# Patient Record
Sex: FEMALE | Race: WHITE | NOT HISPANIC OR LATINO | URBAN - METROPOLITAN AREA
[De-identification: names, ages, dates, MRNs, and addresses within clinical notes are randomized per-mention and may not be internally consistent; named-entity substitution may affect disease eponyms.]

---

## 2017-03-10 PROBLEM — Z00.00 ENCOUNTER FOR PREVENTIVE HEALTH EXAMINATION: Status: ACTIVE | Noted: 2017-03-10

## 2017-11-03 ENCOUNTER — EMERGENCY (EMERGENCY)
Facility: HOSPITAL | Age: 22
LOS: 1 days | End: 2017-11-03
Attending: EMERGENCY MEDICINE
Payer: COMMERCIAL

## 2017-11-03 VITALS
SYSTOLIC BLOOD PRESSURE: 134 MMHG | RESPIRATION RATE: 18 BRPM | DIASTOLIC BLOOD PRESSURE: 83 MMHG | OXYGEN SATURATION: 98 % | HEART RATE: 88 BPM

## 2017-11-03 VITALS
RESPIRATION RATE: 18 BRPM | WEIGHT: 250 LBS | HEIGHT: 68 IN | HEART RATE: 93 BPM | TEMPERATURE: 98 F | OXYGEN SATURATION: 99 % | DIASTOLIC BLOOD PRESSURE: 89 MMHG | SYSTOLIC BLOOD PRESSURE: 135 MMHG

## 2017-11-03 PROCEDURE — 72128 CT CHEST SPINE W/O DYE: CPT | Mod: 26

## 2017-11-03 PROCEDURE — 99285 EMERGENCY DEPT VISIT HI MDM: CPT

## 2017-11-03 PROCEDURE — 72128 CT CHEST SPINE W/O DYE: CPT

## 2017-11-03 PROCEDURE — 72125 CT NECK SPINE W/O DYE: CPT

## 2017-11-03 PROCEDURE — 72125 CT NECK SPINE W/O DYE: CPT | Mod: 26

## 2017-11-03 PROCEDURE — 73030 X-RAY EXAM OF SHOULDER: CPT

## 2017-11-03 PROCEDURE — 73030 X-RAY EXAM OF SHOULDER: CPT | Mod: 26,50

## 2017-11-03 PROCEDURE — 99284 EMERGENCY DEPT VISIT MOD MDM: CPT | Mod: 25

## 2017-11-03 RX ORDER — DIAZEPAM 5 MG
10 TABLET ORAL ONCE
Qty: 0 | Refills: 0 | Status: DISCONTINUED | OUTPATIENT
Start: 2017-11-03 | End: 2017-11-03

## 2017-11-03 RX ORDER — DIAZEPAM 5 MG
1 TABLET ORAL
Qty: 6 | Refills: 0 | OUTPATIENT
Start: 2017-11-03 | End: 2017-11-06

## 2017-11-03 RX ORDER — IBUPROFEN 200 MG
1 TABLET ORAL
Qty: 12 | Refills: 0 | OUTPATIENT
Start: 2017-11-03 | End: 2017-11-06

## 2017-11-03 RX ORDER — IBUPROFEN 200 MG
600 TABLET ORAL ONCE
Qty: 0 | Refills: 0 | Status: COMPLETED | OUTPATIENT
Start: 2017-11-03 | End: 2017-11-03

## 2017-11-03 RX ORDER — OXYCODONE AND ACETAMINOPHEN 5; 325 MG/1; MG/1
1 TABLET ORAL ONCE
Qty: 0 | Refills: 0 | Status: DISCONTINUED | OUTPATIENT
Start: 2017-11-03 | End: 2017-11-03

## 2017-11-03 RX ADMIN — Medication 600 MILLIGRAM(S): at 21:00

## 2017-11-03 RX ADMIN — OXYCODONE AND ACETAMINOPHEN 1 TABLET(S): 5; 325 TABLET ORAL at 22:54

## 2017-11-03 RX ADMIN — Medication 10 MILLIGRAM(S): at 20:16

## 2017-11-03 RX ADMIN — Medication 600 MILLIGRAM(S): at 20:16
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
## 2018-06-21 NOTE — ED ADULT NURSE NOTE - MUSCULOSKELETAL ASSESSMENT
Unable to contact mom to inform her pt's appt needs to be r/s'd due to MD being out. Pt's appt will be cancelled. A letter was mailed out today informing mom of cancellation and requesting she call to r/s.  
- - -

## 2019-07-22 NOTE — ED ADULT NURSE NOTE - CAS EDP DISCH TYPE
Physical Therapy Evaluation    Visit Count: 1     Plan of Care: 7/22/2019 Through: 9/2/2019  Insurance Information:  Payor: Medicaid - [facility] ([facility])  Authorization Needed: No  Maximum Visit Limit Per Year: Based on medical necessity at point of claim  Referred by: Brianne Duran DO; Next provider visit (if known/scheduled): 08/08/2019  Medical Diagnosis (from order):   Diagnosis Information      Diagnosis    646.80, 724.5 (ICD-9-CM) - O99.89, M54.9 (ICD-10-CM) - Back pain in pregnancy            Treatment Diagnosis: Lumbar/Sacroiliac symptoms with increased pain/symptoms, impaired posture, impaired strength, impaired range of motion, impaired gait  Date of onset/injury: Mid pregnancy  Diagnosis Precautions: Pregnant  Chart reviewed at time of initial evaluation (relevant co-morbidities, allergies, tests and medications listed): Reviewed with patient: 22 weeks     SUBJECTIVE   Description of Problem and/or Mechanism of Injury: Reports she has had problems with her sciatic nerve before, but since being pregnant things are worse. Reports symptoms started mid pregnancy and has progressively gotten worse. At this time having trouble sitting/standing for longer than an hour. At this time is sleeping on her side but is having trouble getting comfortable due to the pain.     Occupation: Desk work,     Pain:  Intensity (0-10 scale): Current: 7; Pain Range (best-worst): 2-10  Location: Across low back, right sciatic on occasion, mid thoracic pain   Quality/Description: Ache, Sharp, Stiff  Relieving/Alleviating factors: rest  Any tripping, stumbling, loss of balance: No  Any changes in bowel/bladder function: No  Pain with coughing and sneezing: No  Any numbness/tingling or radiating pain: No  Night pain: No    Function:  Limitations/exacerbating factors: pain, difficulty, increased time to complete with bed mobility, sitting limited to 60 minutes, standing limited to 60 minutes   Prior  level: independent with all activities of daily living and instrumental activities of daily living (history of sciatic)    Patient Goal:   Improve sleep  Standing/Sitting for longer than 60 minutes     Prior Treatment: no therapies in the past year for current condition. Hospitalization, home health services or skilled nursing facility in the last 30 days: No, per patient.    Home Environment/Social Support: Patient lives with significant other, with children, in an apartment; consistent assist from family/friends available.      Safety:  Do you feel safe at home, work and/or school? yes, per patient  Patient denies 2 or more falls or an unexplained fall with injury in the last year, further testing not required   2-weeks ago had full, has been check in regards to pregnancy    OBJECTIVE   Informed consent was obtained prior to performing examination/evaluation/assessment and/or treatment. Prior to any palpation, tactile cueing, and/or hands on treatment (ie. Therapeutic exercise/activities, Neuro-reeducation / manual therapy,gait) involving patient contact; patient was educated on rationale/reason prior to initiating the action.      Posture/Observation/Gait:  Anterior pelvic tilt   Gait: Pelvic drop bilaterally, wide base of support  --improved with sacroiliac belt and abdominal engagement       Lumbar Range of Motion (%)  Date Initial    Flexion Ankle mortise   Extension Full (radicular symptoms into left leg)   Left Lateral Flexion Full   Right Lateral Flexion Full   Left Rotation Full    Right Rotation Full    standard testing positions unless otherwise noted; ranges are reported in active range of motion unless noted AA=active assistive range of motion and P=passive range of motion; * =pain  Hip screen: negative    Strength (out of 5)  Date Initial Initial   Abdominals Fair Fair    Trunk Extensors      Left Right   Hip Flexors (L2-3) 4- 4   Hip Extensors (L4-5) 4- 4   Hip Abductors (L4-5) 4 4+   Hip Adductors  (L2-3) 4 4+   Hip External Rotators (L4-5)     Hip Internal Rotators (L2-3)     Knee Extensors (L3-4) 4+ 5   Knee Flexors (L5-S1) 4 4+   Ankle Plantar Flexors (S1-2)   4+ 5   Ankle Dorsiflexors (L4-L5)  4+ 5   Ankle Invertors (L4)     Ankle Evertors (L5-S1)     Extensor Hallucis Longus (L5)     standard testing positions unless otherwise noted, nerve root level included in ( ); *=pain  Only muscle strength that was assessed are noted.    Deep Tendon Reflex:  Assessment of patellar and achilles completed; only deficits reported:  Patellar (L3/4): Normal  Achilles (L5/S1): Normal    Dermatome:  Light touch within normal limits bilaterally    Muscle Flexibility   only deficits assessed reported below:   Left Right  Left Right   Adductors (long)   Adductors (short)     Piriformis    Hamstring     Iliopsoas   Iliotibial Band     Rectus Femoris/Quadriceps   Gastrocnemius     Soleus         X=impairment assessed; amount of impairment maybe indicated by min=minimal impairment, mod=moderate impairment, sev=severe impairment; hamstring may be reported in 90/90 test as indicated by degrees    Palpation:  tenderness to palpation: Lower lumbar spine / bilaterally PSIS     Joint Play Assessment:  Lumbar: Deferred  Hip Internal Rotation: within functional limits     Special Tests:  Passive Straight Leg Raise Test: Negative bilaterally  for nerve root impingement  Slump Test: Positive left for nerve root impingement  MATTEO: Positive bilaterally for hip joint pathology, iliopsoas spasm, or sacroiliac joint dysfunction  Babinski: Negative bilaterally for upper motor neuron/central canal stenosis  SI Testing: Deferred given pregnancy    Functional Tests:   Deferred at this time     Outcome Measures:   Oswestry: OSWESTRY Score Calculated: 36 % (0-20% = minimal disability; 20-40% = moderate disability; 40-60% = severe disability; 60-80% = crippled; % = bed bound)     Initial Treatment   Initial evaluation completed.  Patient was  identified by name and date of birth; verbal consent obtained from patient prior to initiating evaluation/examination. A thorough systems review was conducted during the evaluation and no red flags were discovered at this time.        Therapeutic Exercise:   Sciatic nerve glides x10 bilaterally   Trial of piriformis stretch   Clam shell x10 bilaterally   Standing transverse abdominus activation x10 5\" holds  At the end of the session, patient was provided a home exercise handout and all questions and concerns were addressed, with patient being able to demonstrate proper form with all exercises.     Therapeutic Activity:  Patient was educated on anatomy and physiology of condition. Findings of the evaluation and plan of care going forward. Patient was educated on the purpose of physical therapy, the role of the therapist and patient in rehabilitation, and the importance of compliance with the home exercise plan and attending scheduled visits.   Discussed prenatal cradle (MD consult for)  Trial of SI belt_significant improvement    Skilled input: as detailed above / Established initial home program; ensured safe performance prior to formally distributing to patient for home use.      Home Program:  * above=instructed home program    Access Code: [email or web address]   URL: [email or web address]   Date: 07/22/2019   Prepared by: Jacques De Jesus     Exercises  Seated Sciatic Nerve Tensioner - 10 reps - 1 sets - 3x daily - 7x weekly  Clamshell - 10 reps - 1 sets - 3x daily - 7x weekly  Standing Transverse Abdominis Contraction - 10 reps - 1 sets - 3x daily - 7x weekly    Writer verbally educated the patient and received verbal consent from the patient on hand placement, positioning of patient, and techniques to be performed today including clothing adjustments for techniques, therapist position for techniques, hand placement and palpation for techniques, lumbosacral assessment  as described above and how they are  pertinent to the patient's plan of care.     Suggestions for next session as indicated: progress per plan of care     ASSESSMENT   27 year old female patient has signs and symptoms consistent with sacroiliac joint dysfunction and lumbar pain associated with mobility deficits that has reported functional limitations listed above. Trial of sacroiliac belt significantly improved symptoms. Patient was provided initial home program to address strength/lumbo-pelvic stability. Skilled physical therapy is  a medical necessity at this time given the above impairments and functional limitations.      Patient will benefit from skilled therapy and rehab potential is good based on assessment above   Predicted patient presentation: Moderate (evolving) - Patient comorbidities and complexities, as defined above, may have varying impact on steady progress for prescribed plan of care.    PLAN   Goals: To be obtained by end of this plan of care:  1. Patient will be independent with progressed and modified home exercise program   2. Decrease pain/symptoms to 4/10  3. Improve involved strength to 4+/5  through improvements listed above patient will:  4. Be able to sit for >60 minutes with minimal pain/difficulty to improve work duties  5. Be able to stand for >60 minutes with minimal pain/difficulty to improve activities of independent daily living  6. Oswestry: Patient will complete form to reflect an improved score from initial score of 36 to less than or equal to 24% to indicate patient reported improvement in function/disability/impairment (minimal detectable change: 12%).    The following skilled interventions to be implemented to achieve above:  Manual Therapy (36922)  Neuromuscular Re-Education (94904)  Therapeutic Activity (29445)  Therapeutic Exercise (43129)    Frequency/Duration: 2 times per week for 6 weeks with tapering as the patient progresses for an estimated total of 12 visits    patient involved in and agreed to plan  of care and goals.   Attendance policy provided at time of evaluation.     Patient Education:   Who will be receiving education: patient  Are they ready to learn: yes  Preferred learning style: written, verbal, demonstration  Barriers to learning: no barriers apparent at this time   Result of initial outlined education: Verbalizes understanding and Needs reinforcement    THERAPY DAILY BILLING   Insurance: 1. [facility] 2. N/A    Evaluation Procedures:  Physical Therapy Evaluation: Moderate Complexity    Timed Procedures:  Therapeutic Activity, 5 minutes  Therapeutic Exercise, 11 minutes    Untimed Procedures:  No untimed codes were used on this date of service    Total Treatment Time: 45 minutes    The referring provider's electronic or written signature on the evaluation authorizes the therapy plan of care and certifies the need for these services, furnished under this plan of care while under their care.  Electronically sent for referring provider signature   Home

## 2023-07-13 ENCOUNTER — APPOINTMENT (OUTPATIENT)
Dept: ORTHOPEDIC SURGERY | Facility: CLINIC | Age: 28
End: 2023-07-13
Payer: OTHER MISCELLANEOUS

## 2023-07-13 VITALS — HEIGHT: 64 IN | WEIGHT: 293 LBS | BODY MASS INDEX: 50.02 KG/M2

## 2023-07-13 DIAGNOSIS — Z80.9 FAMILY HISTORY OF MALIGNANT NEOPLASM, UNSPECIFIED: ICD-10-CM

## 2023-07-13 DIAGNOSIS — Z78.9 OTHER SPECIFIED HEALTH STATUS: ICD-10-CM

## 2023-07-13 DIAGNOSIS — M23.91 UNSPECIFIED INTERNAL DERANGEMENT OF RIGHT KNEE: ICD-10-CM

## 2023-07-13 PROCEDURE — 99204 OFFICE O/P NEW MOD 45 MIN: CPT

## 2023-07-13 PROCEDURE — 73564 X-RAY EXAM KNEE 4 OR MORE: CPT | Mod: RT

## 2023-07-13 RX ORDER — MELOXICAM 15 MG/1
15 TABLET ORAL
Qty: 30 | Refills: 0 | Status: COMPLETED | COMMUNITY
Start: 2023-07-13 | End: 2023-08-12

## 2023-07-13 NOTE — PHYSICAL EXAM
[Right] : right knee [] : no erythema [FreeTextEntry9] : Not assessed patient is in wheelchair. [de-identified] : Not assessed.  [de-identified] : Patient is using wheelchair in office. She already has crutches and a brace on the left leg due to an ankle injury.

## 2023-07-13 NOTE — ASSESSMENT
[FreeTextEntry1] : Underlying pathology reviewed and treatment options discussed. \par 07/13/2023 : RIght knee xrays, 4 views,  reveal negative\par Obtain MRI right knee if pain persists.  \par Patient will use crutches. She has a brace on the left leg already. \par Activity modifier as tolerated.  Prescribed Mobic.  Apply ice to affected area. \par Questions addressed.\par  \par The documentation recorded by the scribe accurately reflects the service I personally performed and the decisions made by me.\par I, Nithya Eagleibe, attest that this documentation has been prepared under the direction and in the presence of Provider Steven Mccall MD.\par \par The patient was seen by Steven Mccall MD.

## 2023-07-13 NOTE — HISTORY OF PRESENT ILLNESS
[Work related] : work related [Sudden] : sudden [8] : 8 [4] : 4 [Dull/Aching] : dull/aching [Sharp] : sharp [Stabbing] : stabbing [Frequent] : frequent [Leisure] : leisure [Rest] : rest [Walking] : walking [Not working due to injury] : Work status: not working due to injury [de-identified] : 07/13/2023: this is a 27 yo female with right knee pain after falling off a curb at work. 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

## 2023-07-23 ENCOUNTER — FORM ENCOUNTER (OUTPATIENT)
Age: 28
End: 2023-07-23

## 2023-07-24 ENCOUNTER — APPOINTMENT (OUTPATIENT)
Dept: MRI IMAGING | Facility: CLINIC | Age: 28
End: 2023-07-24
Payer: OTHER MISCELLANEOUS

## 2023-07-24 PROCEDURE — 73721 MRI JNT OF LWR EXTRE W/O DYE: CPT | Mod: LT

## 2023-07-24 PROCEDURE — 73721 MRI JNT OF LWR EXTRE W/O DYE: CPT | Mod: RT

## 2023-07-27 ENCOUNTER — NON-APPOINTMENT (OUTPATIENT)
Age: 28
End: 2023-07-27

## 2023-07-27 ENCOUNTER — APPOINTMENT (OUTPATIENT)
Dept: ORTHOPEDIC SURGERY | Facility: CLINIC | Age: 28
End: 2023-07-27
Payer: OTHER MISCELLANEOUS

## 2023-07-27 VITALS — BODY MASS INDEX: 50.02 KG/M2 | WEIGHT: 293 LBS | HEIGHT: 64 IN

## 2023-07-27 DIAGNOSIS — S80.01XA CONTUSION OF RIGHT KNEE, INITIAL ENCOUNTER: ICD-10-CM

## 2023-07-27 PROCEDURE — 99214 OFFICE O/P EST MOD 30 MIN: CPT

## 2023-07-27 NOTE — HISTORY OF PRESENT ILLNESS
[Work related] : work related [Sudden] : sudden [8] : 8 [4] : 4 [Dull/Aching] : dull/aching [Sharp] : sharp [Stabbing] : stabbing [Frequent] : frequent [Leisure] : leisure [Rest] : rest [Walking] : walking [Not working due to injury] : Work status: not working due to injury [de-identified] : 07/27/2023: Follow up right knee, here with MRI results\par \par 07/13/2023: this is a 29 yo female with right knee pain after falling off a curb at work. 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

## 2023-07-27 NOTE — DATA REVIEWED
[MRI] : MRI [Right] : of the right [Knee] : knee [Report was reviewed and noted in the chart] : The report was reviewed and noted in the chart [I independently reviewed and interpreted images and report] : I independently reviewed and interpreted images and report [FreeTextEntry1] : Impression:  right knee\par 1. Mild diffuse subcutaneous edema and swelling greatest anteriorly and slight effusion.\par 2. No acute osseous injury, ligament tear, meniscal tear or loose body.\par 3. Patient motion and large body habitus degrades image quality on multiple sequences.\par E-signed by Arturo Ruiz MD 07/25/2023 \par \par Impression: left ankle\par 1. Findings possibly representing bone contusions or stress reaction involving the inferior distal talus measuring 2 cm and plantar proximal cuboid measuring 1 cm.\par 2. Multiple chronic appearing ligament sprains.\par 3. Mild-to-moderate partial tearing and partial splitting of the peroneal brevis tendon with tenosynovitis.\par 4. Multifocal tendinopathy and tenosynovitis.\par 5. Postoperative changes with hardware in the distal tibia without malalignment.\par 6. Mild soft tissue swelling in the dorsal lateral hindfoot/midfoot which is nonspecific.\par 7. Clinical correlation regarding prior surgical history is recommended.\par E-signed by Arturo Ruiz MD 07/25/2023

## 2023-07-27 NOTE — PHYSICAL EXAM
[Right] : right knee [NL (0)] : extension 0 degrees [5___] : hamstring 5[unfilled]/5 [] : no erythema [de-identified] : Has crutches and a long cam boot left ankle. [TWNoteComboBox7] : flexion 110 degrees

## 2023-07-27 NOTE — ASSESSMENT
[FreeTextEntry1] : Underlying pathology reviewed and treatment options discussed. \par 07/13/2023 : RIght knee xrays, 4 views,  reveal negative\par Obtain MRI right knee if pain persists.  \par Patient will use crutches. She has a brace on the left leg already. \par Activity modifier as tolerated.  Prescribed Mobic.  Apply ice to affected area. \par Questions addressed.\par  \par 07/27/2023: MRI reviewed and discussed. \par Start PT and HEP to improve mechanics and reduce pain.\par no surgical intervention indicated for the knee. \par The long cam boot may be negatively affecting her gait leading to more right knee pain. \par Questions answered. Activities as tolerated. \par The knee should be near resolved at 6--8 wks. \par \par The documentation recorded by the scribe accurately reflects the service I personally performed and the decisions made by me.\par I, Yaneli Navarro, attest that this documentation has been prepared under the direction and in the presence of Provider Steven Mccall MD.\par \par The patient was seen by Steven Mccall MD.\par

## 2023-08-02 ENCOUNTER — APPOINTMENT (OUTPATIENT)
Dept: ORTHOPEDIC SURGERY | Facility: CLINIC | Age: 28
End: 2023-08-02

## 2023-09-07 ENCOUNTER — APPOINTMENT (OUTPATIENT)
Dept: ORTHOPEDIC SURGERY | Facility: CLINIC | Age: 28
End: 2023-09-07
Payer: OTHER MISCELLANEOUS

## 2023-09-07 PROCEDURE — 99214 OFFICE O/P EST MOD 30 MIN: CPT

## 2023-09-07 NOTE — ASSESSMENT
[FreeTextEntry1] : Underlying pathology reviewed and treatment options discussed.  07/13/2023 : RIght knee xrays, 4 views,  reveal negative Obtain MRI right knee if pain persists.   Patient will use crutches. She has a brace on the left leg already.  Activity modifier as tolerated.  Prescribed Mobic.  Apply ice to affected area.  Questions addressed.   07/27/2023: MRI reviewed and discussed.  Start PT and HEP to improve mechanics and reduce pain. no surgical intervention indicated for the knee.  The long cam boot may be negatively affecting her gait leading to more right knee pain.  Questions answered. Activities as tolerated.  The knee should be near resolved at 6--8 wks.   09/07/2023: She is progressing well Her cojndition is improving with physical therapy. For her to continue to recover, it is medically necessary for PT to continue. This is a formal request for 12-15 sessions of PT for increasing ROM, improving strength/mechanics, and decreasing pain.  Activity modifier as tolerated. Questions addressed.  Follow up in 6 weeks.  The documentation recorded by the scribe accurately reflects the service I personally performed and the decisions made by me. I, Yaneli Eagle, attest that this documentation has been prepared under the direction and in the presence of Provider Steven Mccall MD.  The patient was seen by Steven Mccall MD.

## 2023-09-07 NOTE — DATA REVIEWED
[FreeTextEntry1] : Impression:  right knee\par  1. Mild diffuse subcutaneous edema and swelling greatest anteriorly and slight effusion.\par  2. No acute osseous injury, ligament tear, meniscal tear or loose body.\par  3. Patient motion and large body habitus degrades image quality on multiple sequences.\par  E-signed by Arturo Ruiz MD 07/25/2023 \par  \par  Impression: left ankle\par  1. Findings possibly representing bone contusions or stress reaction involving the inferior distal talus measuring 2 cm and plantar proximal cuboid measuring 1 cm.\par  2. Multiple chronic appearing ligament sprains.\par  3. Mild-to-moderate partial tearing and partial splitting of the peroneal brevis tendon with tenosynovitis.\par  4. Multifocal tendinopathy and tenosynovitis.\par  5. Postoperative changes with hardware in the distal tibia without malalignment.\par  6. Mild soft tissue swelling in the dorsal lateral hindfoot/midfoot which is nonspecific.\par  7. Clinical correlation regarding prior surgical history is recommended.\par  E-signed by Arturo Ruiz MD 07/25/2023

## 2023-09-07 NOTE — PHYSICAL EXAM
[Right] : right knee [NL (0)] : extension 0 degrees [5___] : hamstring 5[unfilled]/5 [] : no erythema [de-identified] : Has crutches and a long cam boot left ankle. [TWNoteComboBox7] : flexion 110 degrees

## 2023-09-07 NOTE — HISTORY OF PRESENT ILLNESS
[Work related] : work related [Sudden] : sudden [8] : 8 [4] : 4 [Dull/Aching] : dull/aching [Sharp] : sharp [Stabbing] : stabbing [Frequent] : frequent [Leisure] : leisure [Rest] : rest [Walking] : walking [Not working due to injury] : Work status: not working due to injury [de-identified] : LÁZARO DOI 7/11/2023 09/07/2023: LÁZARO FU she is improving with PT  07/27/2023: Follow up right knee, here with MRI results  07/13/2023: this is a 29 yo female with right knee pain after falling off a curb at work. 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

## 2023-10-18 ENCOUNTER — APPOINTMENT (OUTPATIENT)
Dept: ORTHOPEDIC SURGERY | Facility: CLINIC | Age: 28
End: 2023-10-18
Payer: OTHER MISCELLANEOUS

## 2023-10-18 VITALS — HEIGHT: 64 IN | BODY MASS INDEX: 50.02 KG/M2 | WEIGHT: 293 LBS

## 2023-10-18 DIAGNOSIS — S80.01XD CONTUSION OF RIGHT KNEE, SUBSEQUENT ENCOUNTER: ICD-10-CM

## 2023-10-18 DIAGNOSIS — S86.911A STRAIN OF UNSPECIFIED MUSCLE(S) AND TENDON(S) AT LOWER LEG LEVEL, RIGHT LEG, INITIAL ENCOUNTER: ICD-10-CM

## 2023-10-18 PROCEDURE — 99213 OFFICE O/P EST LOW 20 MIN: CPT

## 2023-11-29 ENCOUNTER — APPOINTMENT (OUTPATIENT)
Dept: ORTHOPEDIC SURGERY | Facility: CLINIC | Age: 28
End: 2023-11-29

## 2024-04-22 NOTE — ED ADULT TRIAGE NOTE - DIRECT TO ROOM CARE INITIATED:
Spoke with Page at [facility]. Patient was scheduled for a hysteroscopic myomectomy however the case was cancelled related to uncontrolled seizures. Advised per Suni Arroyo NP, patient will not be cleared for surgery until she has the EMU done. Emu is scheduled for June. Page verified understanding.  
03-Nov-2017 17:36

## 2024-07-05 ENCOUNTER — NON-APPOINTMENT (OUTPATIENT)
Age: 29
End: 2024-07-05